# Patient Record
Sex: FEMALE | Race: WHITE | Employment: OTHER
[De-identification: names, ages, dates, MRNs, and addresses within clinical notes are randomized per-mention and may not be internally consistent; named-entity substitution may affect disease eponyms.]

---

## 2023-09-18 ENCOUNTER — APPOINTMENT (OUTPATIENT)
Facility: HOSPITAL | Age: 83
DRG: 312 | End: 2023-09-18
Payer: MEDICARE

## 2023-09-18 ENCOUNTER — HOSPITAL ENCOUNTER (INPATIENT)
Facility: HOSPITAL | Age: 83
LOS: 3 days | Discharge: NURSING, REHAB OR LONG-TERM CARE | DRG: 312 | End: 2023-09-21
Attending: STUDENT IN AN ORGANIZED HEALTH CARE EDUCATION/TRAINING PROGRAM | Admitting: HOSPITALIST
Payer: MEDICARE

## 2023-09-18 DIAGNOSIS — G62.9 NEUROPATHY: ICD-10-CM

## 2023-09-18 DIAGNOSIS — R55 NEAR SYNCOPE: ICD-10-CM

## 2023-09-18 DIAGNOSIS — R42 LIGHTHEADEDNESS: Primary | ICD-10-CM

## 2023-09-18 DIAGNOSIS — R00.0 TACHYCARDIA: ICD-10-CM

## 2023-09-18 PROBLEM — I48.0 PAF (PAROXYSMAL ATRIAL FIBRILLATION) (HCC): Status: ACTIVE | Noted: 2023-09-18

## 2023-09-18 PROBLEM — J18.9 PNEUMONIA: Status: ACTIVE | Noted: 2023-09-18

## 2023-09-18 PROBLEM — R19.7 DIARRHEA: Status: ACTIVE | Noted: 2023-09-18

## 2023-09-18 PROBLEM — I50.22 CHRONIC SYSTOLIC CONGESTIVE HEART FAILURE (HCC): Status: ACTIVE | Noted: 2023-09-18

## 2023-09-18 PROBLEM — Z87.09: Status: ACTIVE | Noted: 2023-09-18

## 2023-09-18 LAB
ALBUMIN SERPL-MCNC: 3.6 G/DL (ref 3.4–5)
ALBUMIN/GLOB SERPL: 1.1 (ref 0.8–1.7)
ALP SERPL-CCNC: 95 U/L (ref 45–117)
ALT SERPL-CCNC: 22 U/L (ref 13–56)
AMPHET UR QL SCN: NEGATIVE
ANION GAP SERPL CALC-SCNC: 8 MMOL/L (ref 3–18)
APPEARANCE UR: CLEAR
AST SERPL-CCNC: 18 U/L (ref 10–38)
BACTERIA URNS QL MICRO: ABNORMAL /HPF
BARBITURATES UR QL SCN: NEGATIVE
BASOPHILS # BLD: 0.1 K/UL (ref 0–0.1)
BASOPHILS NFR BLD: 1 % (ref 0–2)
BENZODIAZ UR QL: NEGATIVE
BILIRUB SERPL-MCNC: 0.5 MG/DL (ref 0.2–1)
BILIRUB UR QL: NEGATIVE
BUN SERPL-MCNC: 8 MG/DL (ref 7–18)
BUN/CREAT SERPL: 12 (ref 12–20)
CALCIUM SERPL-MCNC: 9.2 MG/DL (ref 8.5–10.1)
CANNABINOIDS UR QL SCN: NEGATIVE
CHLORIDE SERPL-SCNC: 108 MMOL/L (ref 100–111)
CO2 SERPL-SCNC: 26 MMOL/L (ref 21–32)
COCAINE UR QL SCN: NEGATIVE
COLOR UR: YELLOW
CREAT SERPL-MCNC: 0.68 MG/DL (ref 0.6–1.3)
DIFFERENTIAL METHOD BLD: ABNORMAL
EKG ATRIAL RATE: 62 BPM
EKG DIAGNOSIS: NORMAL
EKG P AXIS: 78 DEGREES
EKG P-R INTERVAL: 138 MS
EKG Q-T INTERVAL: 400 MS
EKG QRS DURATION: 78 MS
EKG QTC CALCULATION (BAZETT): 406 MS
EKG R AXIS: -5 DEGREES
EKG T AXIS: 36 DEGREES
EKG VENTRICULAR RATE: 62 BPM
EOSINOPHIL # BLD: 0.1 K/UL (ref 0–0.4)
EOSINOPHIL NFR BLD: 1 % (ref 0–5)
EPITH CASTS URNS QL MICRO: ABNORMAL /LPF (ref 0–5)
ERYTHROCYTE [DISTWIDTH] IN BLOOD BY AUTOMATED COUNT: 14.8 % (ref 11.6–14.5)
FLUAV RNA SPEC QL NAA+PROBE: NOT DETECTED
FLUBV RNA SPEC QL NAA+PROBE: NOT DETECTED
GLOBULIN SER CALC-MCNC: 3.4 G/DL (ref 2–4)
GLUCOSE SERPL-MCNC: 100 MG/DL (ref 74–99)
GLUCOSE UR STRIP.AUTO-MCNC: NEGATIVE MG/DL
HCT VFR BLD AUTO: 42 % (ref 35–45)
HGB BLD-MCNC: 13.6 G/DL (ref 12–16)
HGB UR QL STRIP: NEGATIVE
IMM GRANULOCYTES # BLD AUTO: 0 K/UL (ref 0–0.04)
IMM GRANULOCYTES NFR BLD AUTO: 1 % (ref 0–0.5)
KETONES UR QL STRIP.AUTO: 15 MG/DL
LEUKOCYTE ESTERASE UR QL STRIP.AUTO: ABNORMAL
LYMPHOCYTES # BLD: 2.2 K/UL (ref 0.9–3.6)
LYMPHOCYTES NFR BLD: 27 % (ref 21–52)
Lab: NORMAL
MAGNESIUM SERPL-MCNC: 1.8 MG/DL (ref 1.6–2.6)
MCH RBC QN AUTO: 30.9 PG (ref 24–34)
MCHC RBC AUTO-ENTMCNC: 32.4 G/DL (ref 31–37)
MCV RBC AUTO: 95.5 FL (ref 78–100)
METHADONE UR QL: NEGATIVE
MONOCYTES # BLD: 0.6 K/UL (ref 0.05–1.2)
MONOCYTES NFR BLD: 7 % (ref 3–10)
NEUTS SEG # BLD: 5 K/UL (ref 1.8–8)
NEUTS SEG NFR BLD: 64 % (ref 40–73)
NITRITE UR QL STRIP.AUTO: NEGATIVE
NRBC # BLD: 0 K/UL (ref 0–0.01)
NRBC BLD-RTO: 0 PER 100 WBC
NT PRO BNP: 1212 PG/ML (ref 0–1800)
OPIATES UR QL: NEGATIVE
PCP UR QL: NEGATIVE
PH UR STRIP: 7 (ref 5–8)
PLATELET # BLD AUTO: 402 K/UL (ref 135–420)
PMV BLD AUTO: 9.4 FL (ref 9.2–11.8)
POTASSIUM SERPL-SCNC: 3.7 MMOL/L (ref 3.5–5.5)
PROT SERPL-MCNC: 7 G/DL (ref 6.4–8.2)
PROT UR STRIP-MCNC: NEGATIVE MG/DL
RBC # BLD AUTO: 4.4 M/UL (ref 4.2–5.3)
RBC #/AREA URNS HPF: ABNORMAL /HPF (ref 0–5)
SARS-COV-2 RNA RESP QL NAA+PROBE: NOT DETECTED
SODIUM SERPL-SCNC: 142 MMOL/L (ref 136–145)
SP GR UR REFRACTOMETRY: 1.02 (ref 1–1.03)
TROPONIN I SERPL HS-MCNC: 11 NG/L (ref 0–54)
TROPONIN I SERPL HS-MCNC: 9 NG/L (ref 0–54)
TSH SERPL DL<=0.05 MIU/L-ACNC: 2.28 UIU/ML (ref 0.36–3.74)
UROBILINOGEN UR QL STRIP.AUTO: 0.2 EU/DL (ref 0.2–1)
WBC # BLD AUTO: 7.9 K/UL (ref 4.6–13.2)
WBC URNS QL MICRO: ABNORMAL /HPF (ref 0–5)

## 2023-09-18 PROCEDURE — 71045 X-RAY EXAM CHEST 1 VIEW: CPT

## 2023-09-18 PROCEDURE — 81001 URINALYSIS AUTO W/SCOPE: CPT

## 2023-09-18 PROCEDURE — 71275 CT ANGIOGRAPHY CHEST: CPT

## 2023-09-18 PROCEDURE — 87636 SARSCOV2 & INF A&B AMP PRB: CPT

## 2023-09-18 PROCEDURE — 85025 COMPLETE CBC W/AUTO DIFF WBC: CPT

## 2023-09-18 PROCEDURE — 87086 URINE CULTURE/COLONY COUNT: CPT

## 2023-09-18 PROCEDURE — 2580000003 HC RX 258: Performed by: HOSPITALIST

## 2023-09-18 PROCEDURE — 70450 CT HEAD/BRAIN W/O DYE: CPT

## 2023-09-18 PROCEDURE — 1100000003 HC PRIVATE W/ TELEMETRY

## 2023-09-18 PROCEDURE — 99285 EMERGENCY DEPT VISIT HI MDM: CPT

## 2023-09-18 PROCEDURE — 83735 ASSAY OF MAGNESIUM: CPT

## 2023-09-18 PROCEDURE — 2580000003 HC RX 258: Performed by: STUDENT IN AN ORGANIZED HEALTH CARE EDUCATION/TRAINING PROGRAM

## 2023-09-18 PROCEDURE — 74176 CT ABD & PELVIS W/O CONTRAST: CPT

## 2023-09-18 PROCEDURE — 83880 ASSAY OF NATRIURETIC PEPTIDE: CPT

## 2023-09-18 PROCEDURE — 6360000002 HC RX W HCPCS: Performed by: STUDENT IN AN ORGANIZED HEALTH CARE EDUCATION/TRAINING PROGRAM

## 2023-09-18 PROCEDURE — 96365 THER/PROPH/DIAG IV INF INIT: CPT

## 2023-09-18 PROCEDURE — 93005 ELECTROCARDIOGRAM TRACING: CPT | Performed by: STUDENT IN AN ORGANIZED HEALTH CARE EDUCATION/TRAINING PROGRAM

## 2023-09-18 PROCEDURE — 84484 ASSAY OF TROPONIN QUANT: CPT

## 2023-09-18 PROCEDURE — 6360000002 HC RX W HCPCS: Performed by: HOSPITALIST

## 2023-09-18 PROCEDURE — 6360000004 HC RX CONTRAST MEDICATION: Performed by: FAMILY MEDICINE

## 2023-09-18 PROCEDURE — 80053 COMPREHEN METABOLIC PANEL: CPT

## 2023-09-18 PROCEDURE — 80307 DRUG TEST PRSMV CHEM ANLYZR: CPT

## 2023-09-18 PROCEDURE — 84443 ASSAY THYROID STIM HORMONE: CPT

## 2023-09-18 RX ORDER — ACETAMINOPHEN 325 MG/1
650 TABLET ORAL EVERY 6 HOURS PRN
Status: DISCONTINUED | OUTPATIENT
Start: 2023-09-18 | End: 2023-09-21 | Stop reason: HOSPADM

## 2023-09-18 RX ORDER — HYDRALAZINE HYDROCHLORIDE 20 MG/ML
10 INJECTION INTRAMUSCULAR; INTRAVENOUS EVERY 6 HOURS PRN
Status: DISCONTINUED | OUTPATIENT
Start: 2023-09-18 | End: 2023-09-21 | Stop reason: HOSPADM

## 2023-09-18 RX ORDER — GABAPENTIN 300 MG/1
300 CAPSULE ORAL 3 TIMES DAILY
Status: DISCONTINUED | OUTPATIENT
Start: 2023-09-18 | End: 2023-09-18

## 2023-09-18 RX ORDER — GABAPENTIN 300 MG/1
1 CAPSULE ORAL 3 TIMES DAILY
Status: ON HOLD | COMMUNITY
Start: 2023-04-01 | End: 2023-09-21 | Stop reason: SDUPTHER

## 2023-09-18 RX ORDER — ENOXAPARIN SODIUM 100 MG/ML
40 INJECTION SUBCUTANEOUS DAILY
Status: DISCONTINUED | OUTPATIENT
Start: 2023-09-18 | End: 2023-09-21 | Stop reason: HOSPADM

## 2023-09-18 RX ORDER — 0.9 % SODIUM CHLORIDE 0.9 %
500 INTRAVENOUS SOLUTION INTRAVENOUS ONCE
Status: COMPLETED | OUTPATIENT
Start: 2023-09-18 | End: 2023-09-18

## 2023-09-18 RX ORDER — DEXTROSE AND SODIUM CHLORIDE 5; .45 G/100ML; G/100ML
INJECTION, SOLUTION INTRAVENOUS CONTINUOUS
Status: DISCONTINUED | OUTPATIENT
Start: 2023-09-18 | End: 2023-09-21 | Stop reason: HOSPADM

## 2023-09-18 RX ORDER — POLYETHYLENE GLYCOL 3350 17 G/17G
17 POWDER, FOR SOLUTION ORAL DAILY PRN
Status: DISCONTINUED | OUTPATIENT
Start: 2023-09-18 | End: 2023-09-21 | Stop reason: HOSPADM

## 2023-09-18 RX ORDER — 0.9 % SODIUM CHLORIDE 0.9 %
1000 INTRAVENOUS SOLUTION INTRAVENOUS ONCE
Status: DISCONTINUED | OUTPATIENT
Start: 2023-09-18 | End: 2023-09-18

## 2023-09-18 RX ORDER — POTASSIUM CHLORIDE 20 MEQ/1
20 TABLET, EXTENDED RELEASE ORAL ONCE
Status: DISCONTINUED | OUTPATIENT
Start: 2023-09-18 | End: 2023-09-21 | Stop reason: HOSPADM

## 2023-09-18 RX ORDER — ONDANSETRON 2 MG/ML
4 INJECTION INTRAMUSCULAR; INTRAVENOUS EVERY 6 HOURS PRN
Status: DISCONTINUED | OUTPATIENT
Start: 2023-09-18 | End: 2023-09-21 | Stop reason: HOSPADM

## 2023-09-18 RX ORDER — ACETAMINOPHEN 650 MG/1
650 SUPPOSITORY RECTAL EVERY 6 HOURS PRN
Status: DISCONTINUED | OUTPATIENT
Start: 2023-09-18 | End: 2023-09-21 | Stop reason: HOSPADM

## 2023-09-18 RX ORDER — AMOXICILLIN AND CLAVULANATE POTASSIUM 875; 125 MG/1; MG/1
1 TABLET, FILM COATED ORAL 2 TIMES DAILY
Status: ON HOLD | COMMUNITY
End: 2023-09-21 | Stop reason: HOSPADM

## 2023-09-18 RX ORDER — SODIUM CHLORIDE 0.9 % (FLUSH) 0.9 %
5-40 SYRINGE (ML) INJECTION PRN
Status: DISCONTINUED | OUTPATIENT
Start: 2023-09-18 | End: 2023-09-21 | Stop reason: HOSPADM

## 2023-09-18 RX ORDER — ONDANSETRON 4 MG/1
4 TABLET, ORALLY DISINTEGRATING ORAL EVERY 8 HOURS PRN
Status: DISCONTINUED | OUTPATIENT
Start: 2023-09-18 | End: 2023-09-21 | Stop reason: HOSPADM

## 2023-09-18 RX ORDER — GABAPENTIN 300 MG/1
300 CAPSULE ORAL 3 TIMES DAILY
Status: ON HOLD | COMMUNITY
End: 2023-09-18

## 2023-09-18 RX ORDER — SODIUM CHLORIDE 9 MG/ML
INJECTION, SOLUTION INTRAVENOUS PRN
Status: DISCONTINUED | OUTPATIENT
Start: 2023-09-18 | End: 2023-09-21 | Stop reason: HOSPADM

## 2023-09-18 RX ORDER — IPRATROPIUM BROMIDE AND ALBUTEROL SULFATE 2.5; .5 MG/3ML; MG/3ML
1 SOLUTION RESPIRATORY (INHALATION) EVERY 4 HOURS PRN
Status: DISCONTINUED | OUTPATIENT
Start: 2023-09-18 | End: 2023-09-21 | Stop reason: HOSPADM

## 2023-09-18 RX ORDER — MAGNESIUM SULFATE 1 G/100ML
1000 INJECTION INTRAVENOUS
Status: COMPLETED | OUTPATIENT
Start: 2023-09-18 | End: 2023-09-18

## 2023-09-18 RX ORDER — GABAPENTIN 300 MG/1
300 CAPSULE ORAL EVERY 8 HOURS PRN
Status: DISCONTINUED | OUTPATIENT
Start: 2023-09-18 | End: 2023-09-21 | Stop reason: HOSPADM

## 2023-09-18 RX ORDER — SODIUM CHLORIDE 0.9 % (FLUSH) 0.9 %
5-40 SYRINGE (ML) INJECTION EVERY 12 HOURS SCHEDULED
Status: DISCONTINUED | OUTPATIENT
Start: 2023-09-18 | End: 2023-09-21 | Stop reason: HOSPADM

## 2023-09-18 RX ADMIN — SODIUM CHLORIDE 500 ML: 9 INJECTION, SOLUTION INTRAVENOUS at 18:15

## 2023-09-18 RX ADMIN — IOPAMIDOL 100 ML: 755 INJECTION, SOLUTION INTRAVENOUS at 14:32

## 2023-09-18 RX ADMIN — SODIUM CHLORIDE, PRESERVATIVE FREE 10 ML: 5 INJECTION INTRAVENOUS at 22:24

## 2023-09-18 RX ADMIN — PIPERACILLIN AND TAZOBACTAM 3375 MG: 3; .375 INJECTION, POWDER, LYOPHILIZED, FOR SOLUTION INTRAVENOUS at 22:21

## 2023-09-18 RX ADMIN — ENOXAPARIN SODIUM 40 MG: 100 INJECTION SUBCUTANEOUS at 22:27

## 2023-09-18 RX ADMIN — DEXTROSE AND SODIUM CHLORIDE: 5; 450 INJECTION, SOLUTION INTRAVENOUS at 23:20

## 2023-09-18 RX ADMIN — MAGNESIUM SULFATE HEPTAHYDRATE 1000 MG: 1 INJECTION, SOLUTION INTRAVENOUS at 15:14

## 2023-09-18 RX ADMIN — SODIUM CHLORIDE 500 ML: 9 INJECTION, SOLUTION INTRAVENOUS at 12:56

## 2023-09-18 ASSESSMENT — PAIN - FUNCTIONAL ASSESSMENT: PAIN_FUNCTIONAL_ASSESSMENT: NONE - DENIES PAIN

## 2023-09-18 ASSESSMENT — LIFESTYLE VARIABLES: HOW OFTEN DO YOU HAVE A DRINK CONTAINING ALCOHOL: MONTHLY OR LESS

## 2023-09-18 NOTE — ED NOTES
Pt resting on stretcher, blood colected and sent to lab. Attempted to preform orthostatic BP readings however pt was unable to tolerate standing due to feeling of dizziness.       Samir Crane RN  09/18/23 8515

## 2023-09-18 NOTE — ED NOTES
Pt placed on purwick for comfort. Pt with no further needs at this time. Call kaylah nguyen.       Shwetha Coleman RN  09/18/23 2208

## 2023-09-18 NOTE — H&P
History & Physical    Patient: Yovanny Odell MRN: [id number]  CSN: [id number]    YOB: 1940  Age: 82 y.o. Sex: female      DOA: 9/18/2023  Primary Care Provider:  Ania Puente DO      Assessment/Plan     Active Hospital Problems    Diagnosis Date Noted    Near syncope [R55] 09/18/2023    Diarrhea [R19.7] 09/18/2023    Pneumonia [J18.9] 09/18/2023    PAF (paroxysmal atrial fibrillation) (HCC) [I48.0] 09/18/2023    Chronic systolic congestive heart failure (HCC) [I50.22] 09/18/2023    Hx of pleural empyema [Z87.09] 09/18/2023         Admit to tele     Near syncope   + for orthostatic hypotension -received 1 L fluid-will continue iv hydration like gi loss -recheck orthostatic tomorrow   Neuro check   Cta chest no PE   Will have cardiac monitoring   Ct head  and fall precaution   Echo and trop monitoring   Cardiologist consulted       Pna hx of empyema -chest tube drain and emphysema reported also   Planning augmentin for 28 days,hold per her self-will start vanc and zosyn ID consult   Flu and covid 19 negative   Breathing tx as needed     Diarrhea   Iv hydration, balance the electrolytes   Need to r/o c diff     Paf cardiac monitoring   Received mg in ER and will give K also to keep mg at 2 and K at 4     Chf    Ef 40-45 % from sentera reported   Low rate iv hydration watch for fluid overloaded      Full code   Please note that this dictation was completed with [employer], the [employer] voice recognition software. Quite often unanticipated grammatical, syntax, homophones, and other interpretive errors are inadvertently transcribed by the computer software. Please disregard these errors.   Please excuse any errors that have escaped final proofreading    Estimate  length of stay : 2-3 day    DVT : heparin ppi proph  CC: near syncope        HPI:     Yovanny Odell is a 82 y.o. female with hx of empyema on augmentin over 28 days-request chest tube, paf, chf, sepsis due to pna presented to ER due to dizziness \"LDLCALC\", \"LABVLDL\", \"CHOLHDLRATIO\"  Cardiac Enzymes: No results found for: \"CKTOTAL\", \"CKMB\", \"CKMBINDEX\", \"TROPONINI\"  Urin analysis:   Recent Labs     09/18/23  1518   [facility] 1.025   PROTEINU Negative   GLUCOSEU Negative   KETUA 15*   BILIRUBINUR Negative   NITRU Negative   LEUKOCYTESUR SMALL*       Imaging results last 24 hrs :CTA CHEST W WO CONTRAST    Result Date: 9/18/2023  EXAM:  CTA CHEST W WO CONTRAST INDICATION: Pulmonary embolism, persistent tachycardia, history of pneumonia, empyema COMPARISON: None. TECHNIQUE: Helical thin section chest CT following uneventful intravenous administration of nonionic contrast 100 mL of isovue 370 according to departmental PE protocol. Coronal and sagittal reformats were performed. 3D post processing was performed. CT dose reduction was achieved through the use of a standardized protocol tailored for this examination and automatic exposure control for dose modulation. FINDINGS: This is a good quality study for the evaluation of pulmonary embolism to the first subsegmental arterial level. There is no pulmonary embolism to this level. THYROID: No nodule. MEDIASTINUM: No mass or lymphadenopathy. ERICA: No mass or lymphadenopathy. THORACIC AORTA: No aneurysm. HEART: Normal in size. ESOPHAGUS: No wall thickening or dilatation. TRACHEA/BRONCHI: Patent. PLEURA: Trace right pleural effusion, with small loculation along right major fissure. No pneumothorax. LUNGS: Moderate emphysema. Small pleural-based consolidation noted in the right upper lobe anteriorly (series 3 image 89). Mild bibasilar atelectasis. UPPER ABDOMEN: Partially imaged. No acute pathology. BONES: No aggressive bone lesion or fracture. 1.  Negative for PE. 2.  Small right upper lobe consolidation may represent pneumonia. 3.  Trace right pleural effusion. XR CHEST PORTABLE    Result Date: 9/18/2023  EXAM: Portable CXR. COMPARISON: None.   INDICATION: Syncope FINDINGS: There is small right

## 2023-09-18 NOTE — ED PROVIDER NOTES
ECGs available         Radiologic Studies:   Non-plain film images such as CT, Ultrasound and MRI are read by the radiologist. See ED course for any wet reads documented by Dr. Danica Ward     Interpretation per the Radiologist below, if available at the time of this note:    XR CHEST PORTABLE    (Results Pending)       EKG interpretation  See ED course for EKG interpreted by Dr. Danica Ward    Procedures     Unless otherwise noted below, none. Procedures    ED Course     10:41 AM EDT I (Dr. Danica Ward) am the first provider for this patient. Initial assessment performed. I reviewed the vital signs, available nursing notes, past medical history, past surgical history, family history and social history. The patients presenting problems have been discussed, and they are in agreement with the care plan formulated and outlined with them. I have encouraged them to ask questions as they arise throughout their visit. Records Reviewed: Nursing Notes    Is this patient to be included in the SEP-1 core measure? No Exclusion criteria - the patient is NOT to be included for SEP-1 Core Measure due to: 2+ SIRS criteria are not met    MEDICATIONS ADMINISTERED IN THE ED:  Medications - No data to display    ED Course as of 09/18/23 1211   Mon Sep 18, 2023   1039 On my interpretation of EKG [id number] ;NSR rate 62 qtc 406, no BON/STD. [RK]   1210 CBC and CMP reviewed and acutely normal.  [RK]      ED Course User Index  [RK] Cruzito Amaya,        None    Screenings                  CIWA Assessment  BP: (!) 146/104  Pulse: (!) 118       Medical Decision Making     DISCUSSION:  82year old female w/ history of recent empyema and several days of worsening persistent lightheadedness, near-syncope. I do NOT feel this is neurologic in nature as there is no vertigo, vision changes, ataxia, dysmetria, strength or sensory changes, confusion. Suspect arrythmia, hypololemia, orthostatic hypotension, vasovagal syncope, as primary cause. Recently:  Patient was restarted on metoprolol 25mg from ED due to tachycardia at ED. She had cbc, cmp, trop, checked and ekg. Trop was 13. Plan is to obtain CTA chest to rule out pulmonary embolus. She has risk factor of recent prolonged hospitalization but no history of blood clots. Persistent runs of tachycardia yesterday and today are concerning for PE could also be arrhythmia. Patient does not know when last echo was likely not in the last several months per our conversation. Will also check CBC, bnp, chemistry, orthostatic vital signs, magnesium level, TSH, urinalysis, urine culture. Doubt infectious source (was completing multiple weeks of antibiotics with no recent fevers, no cough, no infectious sxs), do not suspect sepsis (no fever, no source, no elevated respiratory rate, no confusion). Will give 500cc fluid bolus for start pending lab workup as Im unsure how bad her CHF is at this time. Re-eval  Patient continues to have persistent on and off tachycardia but not afib in appearance. Consulted cardiology. CTA negative for PE. CT shows perhaps R UL pneumonia but doubtful given length of antibiotics and knowing her hx, doubt she would have a normal xray so soon after her other condition. Orthostatic VS positive. Plan to admit to medicine. Critical Care Time:     I have spent 0 minutes of critical care time involved in lab review, consultations with specialist, family decision-making, and documentation. This time does not include time spent on separately billable procedures. During this entire length of time, I was immediately available to the patient. Critical Care: The reason for providing this level of medical care for this critically ill patient was due a critical illness that impaired one or more vital organ systems such that there was a high probability of imminent or life-threatening deterioration in the patient's condition.  This care involved high complexity decision making to

## 2023-09-18 NOTE — FLOWSHEET NOTE
09/18/23 1248   Vital Signs   Blood Pressure Lying 157/74   Pulse Lying 61 PER MINUTE   Blood Pressure Sitting 133/73   Pulse Sitting 73 PER MINUTE       Pt stated feeling too dizzy to stand up.

## 2023-09-18 NOTE — ED NOTES
TRANSFER - OUT REPORT:    Verbal report given to [address] [place] [address] on Wyonia Councilman  being transferred to  for routine progression of patient care       Report consisted of patient's Situation, Background, Assessment and   Recommendations(SBAR). Information from the following report(s) ED SBAR was reviewed with the receiving nurse. [place] [facility] Assessment:    Presents to emergency department  because of falls (Syncope, seizure, or loss of consciousness): Yes  Age > 79: Yes  Altered Mental Status, Intoxication with alcohol or substance confusion (Disorientation, impaired judgment, poor safety awaremess, or inability to follow instructions): No  Impaired Mobility: Ambulates or transfers with assistive devices or assistance; Unable to ambulate or transer.: Yes             Lines:   Peripheral IV 09/18/23 Right Antecubital (Active)   Site Assessment Clean, dry & intact 09/18/23 1052   Line Status Blood return noted 09/18/23 1052   Phlebitis Assessment No symptoms 09/18/23 1052        Opportunity for questions and clarification was provided.       Patient transported with:  Monitor and Registered Nurse          Edinson Lynch RN  09/18/23 [phone]

## 2023-09-18 NOTE — ED NOTES
Attempted to pull blood off IV line unsuccessful. Call placed to RRT to come an place line/ collect blood work, no answer. Pt resting on stretcher awake, alert and oriented X 4.       Manuela Petty RN  09/18/23 6249

## 2023-09-18 NOTE — ED NOTES
Spoke with Dr. Tish Blunt reported that patient wishes to a DNR      Rachel Blackman RN  09/18/23 7028

## 2023-09-18 NOTE — ED NOTES
Pt resting on stretcher, no needs at this time. Call bell in reach. MD at bedside.       Caro Soliz RN  09/18/23 2462

## 2023-09-19 ENCOUNTER — APPOINTMENT (OUTPATIENT)
Facility: HOSPITAL | Age: 83
DRG: 312 | End: 2023-09-19
Attending: HOSPITALIST
Payer: MEDICARE

## 2023-09-19 ENCOUNTER — APPOINTMENT (OUTPATIENT)
Facility: HOSPITAL | Age: 83
DRG: 312 | End: 2023-09-19
Payer: MEDICARE

## 2023-09-19 PROBLEM — E87.6 HYPOKALEMIA: Status: ACTIVE | Noted: 2023-09-19

## 2023-09-19 LAB
ANION GAP SERPL CALC-SCNC: 6 MMOL/L (ref 3–18)
BACTERIA SPEC CULT: NORMAL
BASOPHILS # BLD: 0.1 K/UL (ref 0–0.1)
BASOPHILS NFR BLD: 1 % (ref 0–2)
BUN SERPL-MCNC: 11 MG/DL (ref 7–18)
BUN/CREAT SERPL: 19 (ref 12–20)
CALCIUM SERPL-MCNC: 8.3 MG/DL (ref 8.5–10.1)
CC UR VC: NORMAL
CHLORIDE SERPL-SCNC: 112 MMOL/L (ref 100–111)
CO2 SERPL-SCNC: 25 MMOL/L (ref 21–32)
CREAT SERPL-MCNC: 0.58 MG/DL (ref 0.6–1.3)
DIFFERENTIAL METHOD BLD: ABNORMAL
EOSINOPHIL # BLD: 0.2 K/UL (ref 0–0.4)
EOSINOPHIL NFR BLD: 2 % (ref 0–5)
ERYTHROCYTE [DISTWIDTH] IN BLOOD BY AUTOMATED COUNT: 15 % (ref 11.6–14.5)
GLUCOSE SERPL-MCNC: 113 MG/DL (ref 74–99)
HCT VFR BLD AUTO: 34.5 % (ref 35–45)
HGB BLD-MCNC: 11.2 G/DL (ref 12–16)
IMM GRANULOCYTES # BLD AUTO: 0.1 K/UL (ref 0–0.04)
IMM GRANULOCYTES NFR BLD AUTO: 1 % (ref 0–0.5)
LYMPHOCYTES # BLD: 2.2 K/UL (ref 0.9–3.6)
LYMPHOCYTES NFR BLD: 23 % (ref 21–52)
MAGNESIUM SERPL-MCNC: 1.9 MG/DL (ref 1.6–2.6)
MCH RBC QN AUTO: 31.2 PG (ref 24–34)
MCHC RBC AUTO-ENTMCNC: 32.5 G/DL (ref 31–37)
MCV RBC AUTO: 96.1 FL (ref 78–100)
MONOCYTES # BLD: 1 K/UL (ref 0.05–1.2)
MONOCYTES NFR BLD: 10 % (ref 3–10)
NEUTS SEG # BLD: 5.9 K/UL (ref 1.8–8)
NEUTS SEG NFR BLD: 63 % (ref 40–73)
NRBC # BLD: 0.06 K/UL (ref 0–0.01)
NRBC BLD-RTO: 0.6 PER 100 WBC
PLATELET # BLD AUTO: 373 K/UL (ref 135–420)
PMV BLD AUTO: 9.6 FL (ref 9.2–11.8)
POTASSIUM SERPL-SCNC: 3.4 MMOL/L (ref 3.5–5.5)
RBC # BLD AUTO: 3.59 M/UL (ref 4.2–5.3)
SERVICE CMNT-IMP: NORMAL
SODIUM SERPL-SCNC: 143 MMOL/L (ref 136–145)
TROPONIN I SERPL HS-MCNC: 10 NG/L (ref 0–54)
TROPONIN I SERPL HS-MCNC: 9 NG/L (ref 0–54)
WBC # BLD AUTO: 9.4 K/UL (ref 4.6–13.2)

## 2023-09-19 PROCEDURE — 99223 1ST HOSP IP/OBS HIGH 75: CPT | Performed by: NURSE PRACTITIONER

## 2023-09-19 PROCEDURE — 97165 OT EVAL LOW COMPLEX 30 MIN: CPT

## 2023-09-19 PROCEDURE — 99497 ADVNCD CARE PLAN 30 MIN: CPT | Performed by: NURSE PRACTITIONER

## 2023-09-19 PROCEDURE — 83735 ASSAY OF MAGNESIUM: CPT

## 2023-09-19 PROCEDURE — 97530 THERAPEUTIC ACTIVITIES: CPT

## 2023-09-19 PROCEDURE — 6360000002 HC RX W HCPCS: Performed by: HOSPITALIST

## 2023-09-19 PROCEDURE — 97162 PT EVAL MOD COMPLEX 30 MIN: CPT

## 2023-09-19 PROCEDURE — 2580000003 HC RX 258: Performed by: HOSPITALIST

## 2023-09-19 PROCEDURE — 93880 EXTRACRANIAL BILAT STUDY: CPT

## 2023-09-19 PROCEDURE — 1100000003 HC PRIVATE W/ TELEMETRY

## 2023-09-19 PROCEDURE — 80048 BASIC METABOLIC PNL TOTAL CA: CPT

## 2023-09-19 PROCEDURE — 6370000000 HC RX 637 (ALT 250 FOR IP): Performed by: HOSPITALIST

## 2023-09-19 PROCEDURE — 36415 COLL VENOUS BLD VENIPUNCTURE: CPT

## 2023-09-19 PROCEDURE — 2500000003 HC RX 250 WO HCPCS: Performed by: HOSPITALIST

## 2023-09-19 PROCEDURE — 85025 COMPLETE CBC W/AUTO DIFF WBC: CPT

## 2023-09-19 PROCEDURE — 70551 MRI BRAIN STEM W/O DYE: CPT

## 2023-09-19 PROCEDURE — 6370000000 HC RX 637 (ALT 250 FOR IP): Performed by: INTERNAL MEDICINE

## 2023-09-19 PROCEDURE — 84484 ASSAY OF TROPONIN QUANT: CPT

## 2023-09-19 RX ORDER — MAGNESIUM SULFATE HEPTAHYDRATE 40 MG/ML
2000 INJECTION, SOLUTION INTRAVENOUS ONCE
Status: COMPLETED | OUTPATIENT
Start: 2023-09-19 | End: 2023-09-19

## 2023-09-19 RX ORDER — CLINDAMYCIN HYDROCHLORIDE 150 MG/1
300 CAPSULE ORAL EVERY 6 HOURS SCHEDULED
Status: DISCONTINUED | OUTPATIENT
Start: 2023-09-19 | End: 2023-09-21 | Stop reason: HOSPADM

## 2023-09-19 RX ORDER — POTASSIUM CHLORIDE 20 MEQ/1
40 TABLET, EXTENDED RELEASE ORAL ONCE
Status: COMPLETED | OUTPATIENT
Start: 2023-09-19 | End: 2023-09-19

## 2023-09-19 RX ADMIN — ENOXAPARIN SODIUM 40 MG: 100 INJECTION SUBCUTANEOUS at 10:06

## 2023-09-19 RX ADMIN — SODIUM CHLORIDE, PRESERVATIVE FREE 10 ML: 5 INJECTION INTRAVENOUS at 09:00

## 2023-09-19 RX ADMIN — CLINDAMYCIN HYDROCHLORIDE 300 MG: 150 CAPSULE ORAL at 21:09

## 2023-09-19 RX ADMIN — SODIUM CHLORIDE, PRESERVATIVE FREE 10 ML: 5 INJECTION INTRAVENOUS at 21:11

## 2023-09-19 RX ADMIN — CLINDAMYCIN HYDROCHLORIDE 300 MG: 150 CAPSULE ORAL at 18:11

## 2023-09-19 RX ADMIN — MAGNESIUM SULFATE HEPTAHYDRATE 2000 MG: 40 INJECTION, SOLUTION INTRAVENOUS at 18:12

## 2023-09-19 RX ADMIN — POTASSIUM CHLORIDE 40 MEQ: 1500 TABLET, EXTENDED RELEASE ORAL at 10:05

## 2023-09-19 RX ADMIN — PIPERACILLIN AND TAZOBACTAM 3375 MG: 3; .375 INJECTION, POWDER, LYOPHILIZED, FOR SOLUTION INTRAVENOUS at 05:38

## 2023-09-19 NOTE — CONSULTS
NEUROLOGY CONSULTATION NOTE    Patient: Eugene Tyler MRN: [id number]  [facility]: [id number]    YOB: 1940  Age: 82 y.o. Sex: female    DOA: 2023 LOS:  LOS: 1 day        Requesting Physician: Dr. Geraldo Becerril  Reason for Consultation: Presyncope               HISTORY OF PRESENT ILLNESS:   Eugene Tyler is a 82 y.o. female with history of Pneumonia with sepsis, status post thoracic tube for pleural effusion, who presented to hospital with dizziness with positional changes. She feels dizzy on lying and on sitting. Sitting may increase the dizziness. She was seen to have low blood pressure when she got dizzy. She did not lose consciousness. She was on several antibiotics for 28 days. She stopped antibiotics three days ago due to diarrhea and G. I. problems. She denies lateralized weakness or numbness. PAST MEDICAL HISTORY:  Past Medical History:   Diagnosis Date    Acute renal failure (ARF) ([address])     Pleural effusion     Sepsis ([address])      PAST SURGICAL HISTORY:  Past Surgical History:   Procedure Laterality Date    CERVICAL FUSION      LUMBAR FUSION       FAMILY HISTORY:  No family history on file. SOCIAL HISTORY:  Social History     Socioeconomic History    Marital status:     Tobacco Use    Smoking status: Former     Packs/day: 0.50     Years: 25.00     Additional pack years: 0.00     Total pack years: 12.50     Types: Cigarettes     Quit date:      Years since quittin.    Smokeless tobacco: Never   Substance and Sexual Activity    Alcohol use: Not Currently    Drug use: Never     MEDICATIONS:  Current Facility-Administered Medications   Medication Dose Route Frequency    magnesium sulfate 2000 mg in water 50 mL IVPB  2,000 mg IntraVENous Once    sodium chloride flush 0.9 % injection 5-40 mL  5-40 mL IntraVENous 2 times per day    sodium chloride flush 0.9 % injection 5-40 mL  5-40 mL IntraVENous PRN    0.9 % sodium chloride infusion   IntraVENous PRN    enoxaparin (LOVENOX) injection 40 mg  40

## 2023-09-19 NOTE — PROGRESS NOTES
Hospitalist Progress Note-critical care note     Patient: Bonita Tarango MRN: [id number]  CSN: [id number]    YOB: 1940  Age: 82 y.o. Sex: female    DOA: 9/18/2023 LOS:  LOS: 1 day            Chief complaint: near syncope , diarrhea , pna, paf , chg hx of pleural empyema     Assessment/Plan         Active Hospital Problems    Diagnosis Date Noted    Hypokalemia [E87.6] 09/19/2023    Near syncope [R55] 09/18/2023    Diarrhea [R19.7] 09/18/2023    Pneumonia [J18.9] 09/18/2023    PAF (paroxysmal atrial fibrillation) ([facility]) [I48.0] 09/18/2023    Chronic systolic congestive heart failure ([address]) [I50.22] 09/18/2023    Hx of pleural empyema [Z87.09] 09/18/2023          Near syncope   + for orthostatic hypotension -received fluid and will continue monitoring and repeat   Cta chest no PE   Ct head no acute issue   and fall precaution   Echo and trop monitoring   Cardiologist consulted -recommend neuro consult-talked with neurologist   Will have mri and carotid ultrasound   Cardiac monitoring results still pending -2 weeks monitoring In her cardiologist office        Hypokalemia   K replacement      Pna hx of empyema -chest tube drain and emphysema reported also   Planning augmentin for 28 days,hold per her self-will start vanc and zosyn-talked with dr. Nickie Owens on admission   Flu and covid 19 negative   Breathing tx as needed      Diarrhea -only one time bm -too solid for c diff-will cancel lab if continue no diarrhea      Paf cardiac monitoring   Received mg in ER and will give K also to keep mg at 2 and K at 4      Chf    Ef 40-45 % from sentera reported       Subjective still having dizziness , dizziness before d/c from Yessi Strickland she was at the bedside. All questions have been answered. 55 total min's spent on patient care including >50% on counseling/coordinating care. Discussed the above assessments.  also discussed labs, medications and hospital course      Disposition :tbd,   Review of size. There is no pulmonary edema. There is no evident pneumothorax or pleural effusion. Small right pleural effusion. Otherwise unremarkable study.       Cole Garcias MD

## 2023-09-19 NOTE — PROGRESS NOTES
Case Management Assessment  Initial Evaluation    Date/Time of Evaluation: 9/19/2023 10:44 AM  Assessment Completed by: Jasmin Garber    If patient is discharged prior to next notation, then this note serves as note for discharge by case management. Patient Name: Aliyah Eason                   YOB: 1940  Diagnosis: Near syncope [R55]                   Date / Time: 9/18/2023 10:16 AM    Patient Admission Status: Inpatient   Readmission Risk (Low < 19, Mod (19-27), High > 27): Readmission Risk Score: 10.3    Current PCP: Rj Mckenzie, DO  PCP verified by CM? Chart Reviewed: Yes      History Provided by:    Patient Orientation:      Patient Cognition:      Hospitalization in the last 30 days (Readmission):  No    If yes, Readmission Assessment in CM Navigator will be completed. Advance Directives:      Code Status: DNR   Patient's Primary Decision Maker is: Named in Scanned ACP Document    Primary Decision Maker: [facility] - Charity    Secondary Decision Maker: Bal[facility] - [phone]    Discharge Planning:    Patient lives with: Alone Type of Home: House  Primary Care Giver:    Patient Support Systems include:     Current Financial resources:    Current community resources:    Current services prior to admission:              Current DME:              Type of Home Care services:  None    ADLS  Prior functional level:    Current functional level:      PT AM-PAC:   /24  OT AM-PAC:   /24    Family can provide assistance at DC: Would you like Case Management to discuss the discharge plan with any other family members/significant others, and if so, who?     Plans to Return to Present Housing:    Other Identified Issues/Barriers to RETURNING to current housing: n/a  Potential Assistance needed at discharge:              Potential DME:    Patient expects to discharge to: [address] [place] [address] for transportation at discharge:      Financial    Payor: Danyelle Mosquera / Plan: MEDICARE PART A AND B / Product Type: *No Product type* /     Does insurance require precert for SNF: No    Potential assistance Purchasing Medications: No  Meds-to-Beds request: Yes    No Pharmacies Listed    Notes:    Factors facilitating achievement of predicted outcomes: n/a    Barriers to discharge: n/a    Additional Case Management Notes: Patient admitted from home. Patient lives alone and was going to start with home instead home care but returned to acute setting and has been unable to start care at home. Patient stated that she has been in an out of [facility] as well as going to [facility] and [facility]. Patient stated that she is unsure Austin Maryan \" this keeps happening to her but would like further workup. Therapy to evaluate patient as well regarding functional status. SW to follow to assist with DC planning needs. The Plan for Transition of Care is related to the following treatment goals of Near syncope [V60]    IF APPLICABLE: The Patient and/or patient representative Herson Ma and her family were provided with a choice of provider and agrees with the discharge plan. Freedom of choice list with basic dialogue that supports the patient's individualized plan of care/goals and shares the quality data associated with the providers was provided to: Patient   Patient Representative Name:       The Patient and/or Patient Representative Agree with the Discharge Plan?  Yes    Kendall Ward  Case Management Department  Ph: [phone] Fax: n/a

## 2023-09-19 NOTE — ACP (ADVANCE CARE PLANNING)
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 conversation. Individuals present for the conversation: Patient, MPOA/Friend Lila Alcaraz      Summary of the conversation:  Patient made the decision today for DNR/DNI, limited additional interventions, and no feeding tubes. POST form signed. Patient has an AMD on file naming Heriberto Santana (friend) as primary MPOA and Lila Alcaraz (friend) as secondary MPOA. Patient confirms this is correct. Details of the conversation and documents completed: Patient is oriented x 4. MPOA/friend Lila Alcaraz at bedside. Introduced our role as palliative medicine team. Support offered as patient describes her near syncope, ongoing for ~1 week, and significantly worried about going home if symptoms persist. Patient lives alone, active, uses stationary bike for 30 minutes twice a day, cooks own meals, and does her own cleaning. Patient does not drive anymore because of her right leg neuropathy. Irving Mcdonald states she checks on patient often, especially since being home since her recent admission to [facility] for pneumonia. Patient has a good understanding of current health situation. Reviewed patient's AMD on file naming Heriberto Santana (friend) as primary MPOA and Lila Alcaraz (friend) as secondary MPOA. Patient confirms this is correct. Discussed the benefits and burdens of CPR in the event of cardiopulmonary arrest.  Discussed the benefits and burdens of intubation in the event of respiratory decline pre-arrest. Discussed the benefits and burdens of feeding tubes in the event of dysphagia or nutritional decline.  Patient states she would not want CPR, intubation, or feeding tubes for

## 2023-09-19 NOTE — PROGRESS NOTES
PT order received and chart reviewed. Attempted to see pt for PT session, RN in room then palliative entering. Will follow up shortly for PT evaluation.    Quoc Michaud, PT, DPT

## 2023-09-19 NOTE — PROGRESS NOTES
Nightshift nurse stated stool sample was sent down per orders for testing. Nurse stated that lab informed her stool was to formed to test for c.diff. Dr. Melisa Rios informed of labs response to stool sample that was sent last night and that patient has had no further bowel movements today. Per Dr. Melisa Rios, if patient does not have more diarrhea or loose stools by the end of day today, enteric isolation and c.diff testing may be cancelled.

## 2023-09-19 NOTE — PROGRESS NOTES
RN observed swelling in right upper extremity. On assessment, severe swelling noted from base of shoulder/underarm to lower forearm. Patient reports pain. Skin hard/taught around insertion site of extended dwell PIV and elbow. As you assess away from the elbow, the skin becomes softer and palpable but remains edematous. IV fluids immediately stopped. Extended dwell PIV removed. Areas of swelling marked for further assessment. Dr. Jamaica Matthew informed of IV infiltration with normal saline. No new orders at this time.

## 2023-09-19 NOTE — PROGRESS NOTES
Phlebotomist attempted to obtain blood per peripheral stick for labs. Patient refused, stating \" I don't want to be stuck again. \". This nurse attempted to encourage patient for the lab draw, but continued refusal stating, \"I'll do it later. \".  We will attempt redraw at a later time

## 2023-09-19 NOTE — PROGRESS NOTES
Bedside and Verbal shift change report given to Lor Lr and Meghna Thompson RN (oncoming nurse) by Stacy Boyd (offgoing nurse). Report included the following information Nurse Handoff Report, Adult Overview, Intake/Output, Recent Results, and Med Rec Status.

## 2023-09-19 NOTE — CONSULTS
Palliative Medicine Consult    Patient Name: Alok Patel  YOB: 1940    Date of Initial Consult: 9/19/2023  Reason for Consult: Goals of care discussions  Requesting Provider: Sera Garvey MD   Primary Care Physician: [facility], DO      SUMMARY:   Alok Patel is a 82 y.o. 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 Current medical issues leading to Palliative Medicine involvement include: goals of care discussions . PALLIATIVE DIAGNOSES:   Goals of care/advance care planning  Near syncope  Orthostatic hypotension  CHF       PLAN:   Goals of care/advance care planning:  Reviewed records from the following unique sources (outside institutions or providers from different services): hospitalist, cardiologist. Reviewed the results of each unique test including CT head , which shows No acute intracranial hemorrhage, mass or infarct. CTA chest negative for PE, CT abdomen pelvis with no acute findings, chest x-ray shows small right pleural effusion, COVID-19 and influenza test negative, normal TSH, negative UDS, mildly anemic with Hgb 11.2, UA not impressive for UTI (culture pending), and positive orthostatic BP readings in ED when going from supine to sitting. Assessment required an independent historian, additional information obtained from patient's MPOA who reports patient is usually independent, highly functioning, and does not have dizziness at baseline. Patient agreed to a goals of care discussion with her [address] (friend) at bedside. Patient has an AMD on file naming Alfred Preciado (friend) as primary MPOA and Harper Alcantar (friend) as secondary MPOA. Patient confirms this is correct.  Patient made the decision Lab Results   Component Value Date/Time     09/19/2023 05:02 AM    K 3.4 09/19/2023 05:02 AM     09/19/2023 05:02 AM    CO2 25 09/19/2023 05:02 AM    BUN 11 09/19/2023 05:02 AM    MG 1.9 09/19/2023 05:02 AM      Lab Results   Component Value Date/Time    GLOB 3.4 09/18/2023 10:24 AM     No results found for: \"INR\", \"PTMR\", \"PT1\", \"APTT\"   No results found for: \"IRON\", \"TIBC\", \"IBCT\", \"FERR\"   No results found for: \"PH\", \"PCO2\", \"PO2\"  No components found for: \"GLPOC\"   No results found for: \"CPK\", \"CKMB\", \"TROPONINI\"               Today, I am providing high complexity decision making for patient with the diagnosis of near syncope with orthostatic hypotension and ongoing investigation, with chronic congestive heart failure, PAF, and recent admission at [facility] on 8/25/2023 for pneumonia which required a chest tube insertion for empyema, all which poses a threat to life or bodily function. Disclaimer: Sections of this note are dictated using utilizing voice recognition software, which may have resulted in some phonetic based errors in grammar and contents. Even though attempts were made to correct all the mistakes, some may have been missed, and remained in the body of the document. If questions arise, please contact our department.

## 2023-09-19 NOTE — PLAN OF CARE
Problem: Discharge Planning  Goal: Discharge to home or other facility with appropriate resources  Outcome: Progressing  Flowsheets (Taken 9/18/2023 4959)  Discharge to home or other facility with appropriate resources:   Identify barriers to discharge with patient and caregiver   Arrange for needed discharge resources and transportation as appropriate   Identify discharge learning needs (meds, wound care, etc)     Problem: Safety - Adult  Goal: Free from fall injury  Outcome: Progressing     Problem: ABCDS Injury Assessment  Goal: Absence of physical injury  Outcome: Progressing

## 2023-09-19 NOTE — CONSULTS
[facility] Consult Note      Patient: Kellie Higginbotham MRN: [id number]  SSN: [id number]    YOB: 1940  Age: 82 y.o. Sex: female    Date of Consultation: 9/18/2023    Reason for Consultation: Near syncope    HPI:  I was asked by Dr. Ainsley Palacios to see this pleasant patient for near syncope. Liz Murguia is a  82year-old pleasant patient came to the hospital with symptoms of on and off near-syncope without any relationship to physical activity. In the ER patient was found to have mildly sinus tachycardiac. CT scan is negative for pulmonary embolism without any evidence of anemia, thyroid disorder, fever, sepsis. Patient have recent empyema treated with long-term antibiotic. Patient have just completed 2 weeks of Holter monitor, complete report to follow. Patient is feeling that she is going to passed out in the ER while I was seeing the patient at that time patient have 100% oxygen saturation, heart rate 70 beats per minute blood pressure was 140/70 mmHg. Patient have history of neuropathy. Patient denied any history of vertigo or ER problem or neck arthritis.                 Past Medical History:   Diagnosis Date    Acute renal failure (ARF) (HCC)     Pleural effusion     Sepsis ([address])      Past Surgical History:   Procedure Laterality Date    CERVICAL FUSION      LUMBAR FUSION       Current Facility-Administered Medications   Medication Dose Route Frequency    sodium chloride flush 0.9 % injection 5-40 mL  5-40 mL IntraVENous 2 times per day    sodium chloride flush 0.9 % injection 5-40 mL  5-40 mL IntraVENous PRN    0.9 % sodium chloride infusion   IntraVENous PRN    enoxaparin (LOVENOX) injection 40 mg  40 mg SubCUTAneous Daily    ondansetron (ZOFRAN-ODT) disintegrating tablet 4 mg  4 mg Oral Q8H PRN    Or    ondansetron (ZOFRAN) injection 4 mg  4 mg IntraVENous Q6H PRN    polyethylene glycol (GLYCOLAX) packet 17 g  17 g Oral Daily PRN    acetaminophen (TYLENOL) tablet 650 mg  650 mg Oral Q6H PRN    Or

## 2023-09-19 NOTE — CONSULTS
[facility]  (A Division of [facility])                                                           Date of Admission: 9/18/2023   Date of Note: 9/19/2023  Reason for Consult: Pneumonia/History of parapneumonic effusion--   Referring MD: Dr Masood Hartman    Thank you for involving me in the care of this patient. Please do not hesitate to contact me on the above number if question or concern. Current Antimicrobials:    Prior Antimicrobials:  Zosyn 9/18 to date     Antibiotics 8/16-IV Unasyn to 8.31  PO augmentin 9/1 to 9/16   Allergy to antibiotics: None       Assessment--ID related:     Right lung parapneumonic effusion/ Empyema- S/P chest tube and treatment at [facility] 8/16 to 9/1/23  -- CT chest removed 8/31. NO VATS, was followed by CTS at [facility]  --Pleural fluid culture X2-- aerobic no growth- last 8/28. Anaerobic sent only on 8/28- NG, cytology negative. AFB. Fungal cutlure NGTD-- ELIS NA  --viral test- covid/flu A/B- negative  --CTA chest-- no PE. Right apical nodular lesion and fissral opacity looks same to my review of CT scans on 9/18 and 8//31 at [facility]  --PPD test repeatedly negative, last done earlier this month    Near Syncope-- reason for admission at [facility]  Poor tolerance/diarrhea from Augmentin    Active Hospital Problems    Diagnosis     Hypokalemia [E87.6]     Near syncope [R55]     Diarrhea [R19.7]     Pneumonia [J18.9]     PAF (paroxysmal atrial fibrillation) (HCC) [I48.0]     Chronic systolic congestive heart failure (HCC) [I50.22]     Hx of pleural empyema [Z87.09]             Recommendation -- ID related:     DC IV antibiotics  Will place her on Clindamycin PO-- to continue to cover anearobes.  Will see how she does while inpatient  Cab treat by PO antibiotics X4 weeks from CT removal as planned by Dr Mau Miller- ID -- to 9/28/23  Needs FU CT chest in 4-6 weeks from end of rx-- for FU    Can DC c diff isolation, ABX associated diarrhea, seems the sensitivity for evaluation of the vasculature and solid organs. FINDINGS: LOWER THORAX: Emphysema. Small right-sided pleural effusion with right basilar atelectasis. LIVER: No mass. BILIARY TREE: Gallbladder is within normal limits. CBD is not dilated. SPLEEN: within normal limits. PANCREAS: No focal abnormality. ADRENALS: Unremarkable. KIDNEYS/URETERS: No calculus or hydronephrosis. STOMACH: Unremarkable. SMALL BOWEL: No dilatation or wall thickening. COLON: No dilatation or wall thickening. APPENDIX: Unremarkable. PERITONEUM: No ascites or pneumoperitoneum. RETROPERITONEUM: No lymphadenopathy or aortic aneurysm. REPRODUCTIVE ORGANS: Coarse calcifications in the uterus are compatible with fibroids. URINARY BLADDER: Excreted contrast fills the bladder. BONES: No destructive bone lesion. L3-4 fusion. ABDOMINAL WALL: No mass or hernia. ADDITIONAL COMMENTS: N/A     1. No acute findings in the abdomen or pelvis. CTA CHEST W WO CONTRAST    Result Date: 9/18/2023  EXAM:  CTA CHEST W WO CONTRAST INDICATION: Pulmonary embolism, persistent tachycardia, history of pneumonia, empyema COMPARISON: None. TECHNIQUE: Helical thin section chest CT following uneventful intravenous administration of nonionic contrast 100 mL of isovue 370 according to departmental PE protocol. Coronal and sagittal reformats were performed. 3D post processing was performed. CT dose reduction was achieved through the use of a standardized protocol tailored for this examination and automatic exposure control for dose modulation. FINDINGS: This is a good quality study for the evaluation of pulmonary embolism to the first subsegmental arterial level. There is no pulmonary embolism to this level. THYROID: No nodule. MEDIASTINUM: No mass or lymphadenopathy. ERICA: No mass or lymphadenopathy. THORACIC AORTA: No aneurysm. HEART: Normal in size. ESOPHAGUS: No wall thickening or dilatation. TRACHEA/BRONCHI: Patent.  PLEURA: Trace right pleural effusion,

## 2023-09-19 NOTE — PROGRESS NOTES
conducted an initial consultation and spiritual assessment for Aniya Theodroe, who is a 82 y.o.,female. Initiated a relationship of care and support. Explored issues of markus, belief, spirituality and Sikh/ritual needs. Request  visit for [address] [place] [address]. Listened empathically. Provided information about Spiritual Care Services.  confirmed Patient's Jew Affiliation. Patient processed feelings about current hospitalization. Offered prayer and assurance of continued prayers on patients behalf. Chart reviewed. Patient expressed gratitude for Spiritual Care visit. Chaplains will continue to follow and will provide pastoral care as needed or requested.  recommends bedside caregivers page  on duty if patient shows signs of acute spiritual or emotional distress. [facility] [id number], [address] [facility] Derek Sanchez M.Div.   Mary Jo Mcintosh  [phone] - Office

## 2023-09-19 NOTE — ACP (ADVANCE CARE PLANNING)
Advance Care Planning     General Advance Care Planning (ACP) Conversation    Date of Conversation: 9/19/2023  Conducted with: Patient with Decision Making Capacity   Healthcare Decision Maker: Named in Advance Directive or Healthcare Power of [phone]a Alarcon Decision Maker:    Primary Decision Maker: Foreign [place] - Friend    Secondary Decision Maker: [facility] [phone]     Today we documented Decision Maker(s) consistent with ACP documents on file. Content/Action Overview: Has ACP document(s) on file - reflects the patient's care preferences  Reviewed DNR/DNI and patient elects DNR order - completed POST and placed order (see below)    [phone] Seen today in room 356 along with Tanda Alpers, NP. Lying in bed with head of bed elevated eating breakfast.  Awake, alert. Oriented x 4. Respirations unlabored on room air. Able to speak in full  sentences. Pain -- denies. (+) lightheadedness Courtney, friend, at bedside    Came to the ED 9/18/2023 from home per EMS for a near syncopal episode. She has been having issues with lightheadedness since a recent admission at [facility] on 8/25/2023 for pneumonia. Required chest tube insertion for empyema. 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, heart failure, paroxysmal atrial fibrillation (information gathered from medical records)    Admitted to telemetry for near syncope (trend orthostatic vitals, CT head, CT chest, ECHO, trend troponin, cardiology consultation), hx empyema (continue previously started antibiotics, prn nebulizer treatments), diarrhea (r/o c diff), paroxysmal atrial fibrillation (keep electrolytes WNL), heart failure history with EF 45% (watch for fluid overload    Palliative Medicine consultation placed by Dr Jamaica Matthew for code status discussion    Introduced role of palliative care to the hospitalized patient. Lives in a single family home. Prior to admission, she was independent in ADLs.

## 2023-09-20 ENCOUNTER — APPOINTMENT (OUTPATIENT)
Facility: HOSPITAL | Age: 83
DRG: 312 | End: 2023-09-20
Attending: INTERNAL MEDICINE
Payer: MEDICARE

## 2023-09-20 LAB
ANION GAP SERPL CALC-SCNC: 3 MMOL/L (ref 3–18)
BASOPHILS # BLD: 0 K/UL (ref 0–0.1)
BASOPHILS NFR BLD: 1 % (ref 0–2)
BUN SERPL-MCNC: 11 MG/DL (ref 7–18)
BUN/CREAT SERPL: 20 (ref 12–20)
CALCIUM SERPL-MCNC: 8.2 MG/DL (ref 8.5–10.1)
CHLORIDE SERPL-SCNC: 115 MMOL/L (ref 100–111)
CO2 SERPL-SCNC: 26 MMOL/L (ref 21–32)
CREAT SERPL-MCNC: 0.56 MG/DL (ref 0.6–1.3)
DIFFERENTIAL METHOD BLD: ABNORMAL
ECHO AO ROOT DIAM: 3 CM
ECHO AO ROOT INDEX: 2.01 CM/M2
ECHO AR MAX VEL PISA: 3.8 M/S
ECHO AV AREA PEAK VELOCITY: 2.3 CM2
ECHO AV AREA VTI: 2.9 CM2
ECHO AV AREA/BSA PEAK VELOCITY: 1.5 CM2/M2
ECHO AV AREA/BSA VTI: 1.9 CM2/M2
ECHO AV MEAN GRADIENT: 3 MMHG
ECHO AV MEAN VELOCITY: 0.9 M/S
ECHO AV PEAK GRADIENT: 6 MMHG
ECHO AV PEAK VELOCITY: 1.2 M/S
ECHO AV REGURGITANT PHT: 5595.4 MILLISECOND
ECHO AV VELOCITY RATIO: 0.83
ECHO AV VTI: 19.5 CM
ECHO BSA: 1.5 M2
ECHO BSA: 1.5 M2
ECHO LA DIAMETER INDEX: 1.68 CM/M2
ECHO LA DIAMETER: 2.5 CM
ECHO LA TO AORTIC ROOT RATIO: 0.83
ECHO LA VOL 4C: 18 ML (ref 22–52)
ECHO LA VOLUME INDEX A4C: 12 ML/M2 (ref 16–34)
ECHO LV E' LATERAL VELOCITY: 6 CM/S
ECHO LV E' SEPTAL VELOCITY: 5 CM/S
ECHO LV EDV A2C: 43 ML
ECHO LV EDV A4C: 56 ML
ECHO LV EDV BP: 49 ML (ref 56–104)
ECHO LV EDV INDEX A4C: 38 ML/M2
ECHO LV EDV INDEX BP: 33 ML/M2
ECHO LV EDV NDEX A2C: 29 ML/M2
ECHO LV EJECTION FRACTION A2C: 51 %
ECHO LV EJECTION FRACTION A4C: 53 %
ECHO LV EJECTION FRACTION BIPLANE: 50 % (ref 55–100)
ECHO LV ESV A2C: 21 ML
ECHO LV ESV A4C: 26 ML
ECHO LV ESV BP: 24 ML (ref 19–49)
ECHO LV ESV INDEX A2C: 14 ML/M2
ECHO LV ESV INDEX A4C: 17 ML/M2
ECHO LV ESV INDEX BP: 16 ML/M2
ECHO LV FRACTIONAL SHORTENING: 29 % (ref 28–44)
ECHO LV INTERNAL DIMENSION DIASTOLE INDEX: 2.82 CM/M2
ECHO LV INTERNAL DIMENSION DIASTOLIC: 4.2 CM (ref 3.9–5.3)
ECHO LV INTERNAL DIMENSION SYSTOLIC INDEX: 2.01 CM/M2
ECHO LV INTERNAL DIMENSION SYSTOLIC: 3 CM
ECHO LV IVSD: 0.7 CM (ref 0.6–0.9)
ECHO LV MASS 2D: 85.1 G (ref 67–162)
ECHO LV MASS INDEX 2D: 57.1 G/M2 (ref 43–95)
ECHO LV POSTERIOR WALL DIASTOLIC: 0.7 CM (ref 0.6–0.9)
ECHO LV RELATIVE WALL THICKNESS RATIO: 0.33
ECHO LVOT AREA: 2.8 CM2
ECHO LVOT AV VTI INDEX: 1.06
ECHO LVOT DIAM: 1.9 CM
ECHO LVOT MEAN GRADIENT: 2 MMHG
ECHO LVOT PEAK GRADIENT: 4 MMHG
ECHO LVOT PEAK VELOCITY: 1 M/S
ECHO LVOT STROKE VOLUME INDEX: 39.4 ML/M2
ECHO LVOT SV: 58.7 ML
ECHO LVOT VTI: 20.7 CM
ECHO PULMONARY ARTERY END DIASTOLIC PRESSURE: 6 MMHG
ECHO PV REGURGITANT MAX VELOCITY: 1.2 M/S
ECHO RA END SYSTOLIC VOLUME APICAL 4 CHAMBER INDEX BSA: 21 ML/M2
ECHO RA VOLUME: 31 ML
ECHO RV FREE WALL PEAK S': 12 CM/S
ECHO RV INTERNAL DIMENSION: 2.2 CM
ECHO RV TAPSE: 2.3 CM (ref 1.7–?)
EOSINOPHIL # BLD: 0.3 K/UL (ref 0–0.4)
EOSINOPHIL NFR BLD: 4 % (ref 0–5)
ERYTHROCYTE [DISTWIDTH] IN BLOOD BY AUTOMATED COUNT: 15.5 % (ref 11.6–14.5)
GLUCOSE BLD STRIP.AUTO-MCNC: 101 MG/DL (ref 70–110)
GLUCOSE SERPL-MCNC: 120 MG/DL (ref 74–99)
HCT VFR BLD AUTO: 35.9 % (ref 35–45)
HGB BLD-MCNC: 11.6 G/DL (ref 12–16)
IMM GRANULOCYTES # BLD AUTO: 0 K/UL (ref 0–0.04)
IMM GRANULOCYTES NFR BLD AUTO: 0 % (ref 0–0.5)
LYMPHOCYTES # BLD: 2 K/UL (ref 0.9–3.6)
LYMPHOCYTES NFR BLD: 27 % (ref 21–52)
MCH RBC QN AUTO: 31 PG (ref 24–34)
MCHC RBC AUTO-ENTMCNC: 32.3 G/DL (ref 31–37)
MCV RBC AUTO: 96 FL (ref 78–100)
MONOCYTES # BLD: 1 K/UL (ref 0.05–1.2)
MONOCYTES NFR BLD: 14 % (ref 3–10)
NEUTS SEG # BLD: 4 K/UL (ref 1.8–8)
NEUTS SEG NFR BLD: 54 % (ref 40–73)
NRBC # BLD: 0 K/UL (ref 0–0.01)
NRBC BLD-RTO: 0 PER 100 WBC
PLATELET # BLD AUTO: 351 K/UL (ref 135–420)
PMV BLD AUTO: 9.8 FL (ref 9.2–11.8)
POTASSIUM SERPL-SCNC: 4.2 MMOL/L (ref 3.5–5.5)
RBC # BLD AUTO: 3.74 M/UL (ref 4.2–5.3)
SODIUM SERPL-SCNC: 144 MMOL/L (ref 136–145)
VAS LEFT CCA DIST EDV: 19.3 CM/S
VAS LEFT CCA DIST PSV: 58.9 CM/S
VAS LEFT CCA PROX EDV: 11.6 CM/S
VAS LEFT CCA PROX PSV: 46.8 CM/S
VAS LEFT ECA EDV: 0 CM/S
VAS LEFT ECA PSV: 53.4 CM/S
VAS LEFT ICA DIST EDV: 34.7 CM/S
VAS LEFT ICA DIST PSV: 84.1 CM/S
VAS LEFT ICA MID EDV: 24.8 CM/S
VAS LEFT ICA MID PSV: 58.9 CM/S
VAS LEFT ICA PROX EDV: 16 CM/S
VAS LEFT ICA PROX PSV: 38 CM/S
VAS LEFT ICA/CCA PSV: 1.4 NO UNITS
VAS LEFT SUBCLAVIAN PROX EDV: 0 CM/S
VAS LEFT SUBCLAVIAN PROX PSV: 144 CM/S
VAS LEFT VERTEBRAL EDV: 17.1 CM/S
VAS LEFT VERTEBRAL PSV: 50.1 CM/S
VAS RIGHT CCA DIST EDV: 18.1 CM/S
VAS RIGHT CCA DIST PSV: 62.5 CM/S
VAS RIGHT CCA PROX EDV: 15.5 CM/S
VAS RIGHT CCA PROX PSV: 55.9 CM/S
VAS RIGHT ECA EDV: 8.7 CM/S
VAS RIGHT ECA PSV: 65.8 CM/S
VAS RIGHT ICA DIST EDV: 26.3 CM/S
VAS RIGHT ICA DIST PSV: 69.1 CM/S
VAS RIGHT ICA MID EDV: 27.4 CM/S
VAS RIGHT ICA MID PSV: 68 CM/S
VAS RIGHT ICA PROX EDV: 14.2 CM/S
VAS RIGHT ICA PROX PSV: 66.9 CM/S
VAS RIGHT ICA/CCA PSV: 1.1 NO UNITS
VAS RIGHT SUBCLAVIAN PROX EDV: 13.9 CM/S
VAS RIGHT SUBCLAVIAN PROX PSV: 110.7 CM/S
VAS RIGHT VERTEBRAL EDV: 18.6 CM/S
VAS RIGHT VERTEBRAL PSV: 47.1 CM/S
WBC # BLD AUTO: 7.3 K/UL (ref 4.6–13.2)

## 2023-09-20 PROCEDURE — 1100000003 HC PRIVATE W/ TELEMETRY

## 2023-09-20 PROCEDURE — 36415 COLL VENOUS BLD VENIPUNCTURE: CPT

## 2023-09-20 PROCEDURE — 2580000003 HC RX 258: Performed by: HOSPITALIST

## 2023-09-20 PROCEDURE — 82962 GLUCOSE BLOOD TEST: CPT

## 2023-09-20 PROCEDURE — 97116 GAIT TRAINING THERAPY: CPT

## 2023-09-20 PROCEDURE — 93306 TTE W/DOPPLER COMPLETE: CPT

## 2023-09-20 PROCEDURE — 2580000003 HC RX 258: Performed by: FAMILY MEDICINE

## 2023-09-20 PROCEDURE — 97535 SELF CARE MNGMENT TRAINING: CPT

## 2023-09-20 PROCEDURE — 6360000002 HC RX W HCPCS: Performed by: HOSPITALIST

## 2023-09-20 PROCEDURE — 85025 COMPLETE CBC W/AUTO DIFF WBC: CPT

## 2023-09-20 PROCEDURE — 6370000000 HC RX 637 (ALT 250 FOR IP): Performed by: INTERNAL MEDICINE

## 2023-09-20 PROCEDURE — 80048 BASIC METABOLIC PNL TOTAL CA: CPT

## 2023-09-20 RX ORDER — SODIUM CHLORIDE, SODIUM LACTATE, POTASSIUM CHLORIDE, AND CALCIUM CHLORIDE .6; .31; .03; .02 G/100ML; G/100ML; G/100ML; G/100ML
500 INJECTION, SOLUTION INTRAVENOUS ONCE
Status: COMPLETED | OUTPATIENT
Start: 2023-09-20 | End: 2023-09-20

## 2023-09-20 RX ADMIN — ENOXAPARIN SODIUM 40 MG: 100 INJECTION SUBCUTANEOUS at 09:55

## 2023-09-20 RX ADMIN — CLINDAMYCIN HYDROCHLORIDE 300 MG: 150 CAPSULE ORAL at 16:32

## 2023-09-20 RX ADMIN — CLINDAMYCIN HYDROCHLORIDE 300 MG: 150 CAPSULE ORAL at 04:06

## 2023-09-20 RX ADMIN — CLINDAMYCIN HYDROCHLORIDE 300 MG: 150 CAPSULE ORAL at 09:55

## 2023-09-20 RX ADMIN — DEXTROSE AND SODIUM CHLORIDE: 5; 450 INJECTION, SOLUTION INTRAVENOUS at 04:11

## 2023-09-20 RX ADMIN — CLINDAMYCIN HYDROCHLORIDE 300 MG: 150 CAPSULE ORAL at 21:40

## 2023-09-20 RX ADMIN — SODIUM CHLORIDE, POTASSIUM CHLORIDE, SODIUM LACTATE AND CALCIUM CHLORIDE 500 ML: 600; 310; 30; 20 INJECTION, SOLUTION INTRAVENOUS at 06:04

## 2023-09-20 NOTE — PROGRESS NOTES
Physician Progress Note      PATIENT:               Cristy Quiñonez  CSN #:                  [id number]  :                       1940  ADMIT DATE:       2023 10:16 AM  DISCH DATE:  RESPONDING  PROVIDER #:        Christy Farias MD          QUERY TEXT:    Patient admitted with Syncope. noted to have orthostatic hypotension . If   possible, please document in progress notes and discharge summary after study   the etiology of the syncope: The medical record reflects the following:  Risk Factors: hypotension,Diarrhea    Clinical Indicators: /H&P/+ for orthostatic hypotension -received 1 L   fluid-will continue iv hydration like gi loss      Treatment: -received 1 L fluid    Thank You  Dominique Hardy RN, CDI,CRCR  Options provided:  -- Syncope due to ,please clarify, please  clarify cause  -- Syncope due to orthostatic hypotension  -- Other - I will add my own diagnosis  -- Disagree - Not applicable / Not valid  -- Disagree - Clinically unable to determine / Unknown  -- Refer to Clinical Documentation Reviewer    PROVIDER RESPONSE TEXT:    The syncope is due to orthostatic hypotension.     Query created by: Dominique Hardy on 2023 11:51 AM      Electronically signed by:  Christy Farias MD 2023 1:13 PM

## 2023-09-20 NOTE — PROGRESS NOTES
Brain MRI is negative for an acute event. I do not think her dizziness is primarily neurologic in nature. I will follow the patient as outpatient for possible other causes (neuropathy, etc)    Neurology signs off.

## 2023-09-20 NOTE — PROGRESS NOTES
Physical Therapy      1011: Nurse in with pt, will follow up.    1052: Pt refusing PT at this time, requesting to come back later today.   Will follow up

## 2023-09-20 NOTE — PROGRESS NOTES
Cardiology Progress Note        Patient: Wolf Form        Sex: female          DOA: 9/18/2023  YOB: 1940      Age:  82 y.o.        LOS:  LOS: 2 days   Assessment/Plan     Principal Problem:    Near syncope  Active Problems:    Diarrhea    Pneumonia    PAF (paroxysmal atrial fibrillation) (HCC)    Chronic systolic congestive heart failure (HCC)    Hx of pleural empyema    Hypokalemia  Resolved Problems:    * No resolved hospital problems. *      Plan:  9/20/2023  Cardiac telemetry sinus rhythm heart rate ranges between 65 beats per minute to 115 beats per minute  Today at the time patient exam patient still complaining of near-syncope with hot flushing/mild sweating symptoms when lying supine with normal rhythm, mild sinus tachycardia 110 beats per minute   I do not think her symptoms are coming from this mild sinus tachycardia   Prolonged and persistent near-syncope symptoms even lying supine with stable rhythm   I suspect possible autonomic neuropathy   Echocardiogram results discussed with patient EF 45% with trivial/small pericardial effusion without any tamponade  At this point there is no indication to put her on any midodrine with beta-blocker. Near-syncope unclear etiology,  patient is lying spine complaining of near-syncope with normal sinus rhythm, normal blood pressure and 100% oxygen. Echocardiogram is pending  Continue supportive treatment  MRI of brain is pending  Cardiac telemetry without any arrhythmia    Result status: Final result       Left Ventricle: Mildly reduced left ventricular systolic function with a visually estimated EF of 45 - 50%. Left ventricle size is normal. Normal wall thickness. Normal wall motion. Indeterminate diastolic function due to E-A fusion. Left Atrium: Left atrium is smaller than normal.    Aortic Valve: Trileaflet valve. Mild regurgitation. Mitral Valve: Mildly thickened leaflet. Mild regurgitation.     Pericardium: Trivial to small mg  300 mg Oral Q8H PRN    potassium chloride (KLOR-CON M) extended release tablet 20 mEq  20 mEq Oral Once    hydrALAZINE (APRESOLINE) injection 10 mg  10 mg IntraVENous Q6H PRN               Lab/Data Reviewed:  Procedures/imaging: see electronic medical records for all procedures/Xrays   and details which were not copied into this note but were reviewed prior to creation of Plan        Recent Labs     09/18/23  1024 09/19/23  0048 09/20/23  0532   WBC 7.9 9.4 7.3   HGB 13.6 11.2* 11.6*   HCT 42.0 34.5* 35.9    373 351       Recent Labs     09/18/23  1024 09/19/23  0502 09/20/23  0532    143 144   K 3.7 3.4* 4.2    112* 115*   CO2 26 25 26   BUN 8 11 11         RADIOLOGY:  CT HEAD WO CONTRAST    Result Date: 9/18/2023  No acute intracranial hemorrhage, mass or infarct. CT ABDOMEN PELVIS WO CONTRAST Additional Contrast? None    Result Date: 9/18/2023  1. No acute findings in the abdomen or pelvis. CTA CHEST W WO CONTRAST    Result Date: 9/18/2023  1. Negative for PE. 2.  Small right upper lobe consolidation may represent pneumonia. 3.  Trace right pleural effusion. XR CHEST PORTABLE    Result Date: 9/18/2023  Small right pleural effusion. Otherwise unremarkable study. Cardiology Procedures:   No results found for this or any previous visit. No results found for this or any previous visit. No results found for this or any previous visit.                 Signed By: Milka Bullard MD     September 20, 2023

## 2023-09-20 NOTE — PLAN OF CARE
Problem: Discharge Planning  Goal: Discharge to home or other facility with appropriate resources  Outcome: Progressing  Flowsheets (Taken 9/20/2023 0000)  Discharge to home or other facility with appropriate resources:   Identify barriers to discharge with patient and caregiver   Arrange for needed discharge resources and transportation as appropriate   Identify discharge learning needs (meds, wound care, etc)   Refer to discharge planning if patient needs post-hospital services based on physician order or complex needs related to functional status, cognitive ability or social support system     Problem: Safety - Adult  Goal: Free from fall injury  Outcome: Progressing  Flowsheets (Taken 9/20/2023 0000)  Free From Fall Injury: Instruct family/caregiver on patient safety     Problem: ABCDS Injury Assessment  Goal: Absence of physical injury  Outcome: Progressing  Flowsheets (Taken 9/20/2023 0000)  Absence of Physical Injury: Implement safety measures based on patient assessment     Problem: Chronic Conditions and Co-morbidities  Goal: Patient's chronic conditions and co-morbidity symptoms are monitored and maintained or improved  Outcome: Progressing  Flowsheets (Taken 9/20/2023 0000)  Care Plan - Patient's Chronic Conditions and Co-Morbidity Symptoms are Monitored and Maintained or Improved:   Monitor and assess patient's chronic conditions and comorbid symptoms for stability, deterioration, or improvement   Collaborate with multidisciplinary team to address chronic and comorbid conditions and prevent exacerbation or deterioration   Update acute care plan with appropriate goals if chronic or comorbid symptoms are exacerbated and prevent overall improvement and discharge      0540 - 150 ml output urine noted overnight. Bladder scan: 64 ml. Dr. Phan Clamp paged  0911 - Provider notified that patient consumed 200ml overnight and is getting IVF.  Orders placed by MD

## 2023-09-21 VITALS
DIASTOLIC BLOOD PRESSURE: 84 MMHG | TEMPERATURE: 97.9 F | OXYGEN SATURATION: 96 % | BODY MASS INDEX: 22.81 KG/M2 | WEIGHT: 120.8 LBS | RESPIRATION RATE: 18 BRPM | HEART RATE: 11 BPM | SYSTOLIC BLOOD PRESSURE: 129 MMHG | HEIGHT: 61 IN

## 2023-09-21 LAB
ANION GAP SERPL CALC-SCNC: 4 MMOL/L (ref 3–18)
BASOPHILS # BLD: 0 K/UL (ref 0–0.1)
BASOPHILS NFR BLD: 1 % (ref 0–2)
BUN SERPL-MCNC: 11 MG/DL (ref 7–18)
BUN/CREAT SERPL: 20 (ref 12–20)
CALCIUM SERPL-MCNC: 8.3 MG/DL (ref 8.5–10.1)
CHLORIDE SERPL-SCNC: 113 MMOL/L (ref 100–111)
CO2 SERPL-SCNC: 25 MMOL/L (ref 21–32)
CREAT SERPL-MCNC: 0.55 MG/DL (ref 0.6–1.3)
DIFFERENTIAL METHOD BLD: ABNORMAL
EOSINOPHIL # BLD: 0.3 K/UL (ref 0–0.4)
EOSINOPHIL NFR BLD: 4 % (ref 0–5)
ERYTHROCYTE [DISTWIDTH] IN BLOOD BY AUTOMATED COUNT: 15.3 % (ref 11.6–14.5)
GLUCOSE SERPL-MCNC: 112 MG/DL (ref 74–99)
HCT VFR BLD AUTO: 35.3 % (ref 35–45)
HGB BLD-MCNC: 11.4 G/DL (ref 12–16)
IMM GRANULOCYTES # BLD AUTO: 0 K/UL (ref 0–0.04)
IMM GRANULOCYTES NFR BLD AUTO: 0 % (ref 0–0.5)
LYMPHOCYTES # BLD: 1.9 K/UL (ref 0.9–3.6)
LYMPHOCYTES NFR BLD: 25 % (ref 21–52)
MCH RBC QN AUTO: 30.8 PG (ref 24–34)
MCHC RBC AUTO-ENTMCNC: 32.3 G/DL (ref 31–37)
MCV RBC AUTO: 95.4 FL (ref 78–100)
MONOCYTES # BLD: 0.9 K/UL (ref 0.05–1.2)
MONOCYTES NFR BLD: 11 % (ref 3–10)
NEUTS SEG # BLD: 4.6 K/UL (ref 1.8–8)
NEUTS SEG NFR BLD: 59 % (ref 40–73)
NRBC # BLD: 0 K/UL (ref 0–0.01)
NRBC BLD-RTO: 0 PER 100 WBC
PLATELET # BLD AUTO: 341 K/UL (ref 135–420)
PMV BLD AUTO: 9.9 FL (ref 9.2–11.8)
POTASSIUM SERPL-SCNC: 3.9 MMOL/L (ref 3.5–5.5)
RBC # BLD AUTO: 3.7 M/UL (ref 4.2–5.3)
SODIUM SERPL-SCNC: 142 MMOL/L (ref 136–145)
WBC # BLD AUTO: 7.7 K/UL (ref 4.6–13.2)

## 2023-09-21 PROCEDURE — 99232 SBSQ HOSP IP/OBS MODERATE 35: CPT | Performed by: NURSE PRACTITIONER

## 2023-09-21 PROCEDURE — 6370000000 HC RX 637 (ALT 250 FOR IP): Performed by: INTERNAL MEDICINE

## 2023-09-21 PROCEDURE — 2580000003 HC RX 258: Performed by: HOSPITALIST

## 2023-09-21 PROCEDURE — 36415 COLL VENOUS BLD VENIPUNCTURE: CPT

## 2023-09-21 PROCEDURE — 6370000000 HC RX 637 (ALT 250 FOR IP): Performed by: HOSPITALIST

## 2023-09-21 PROCEDURE — 80048 BASIC METABOLIC PNL TOTAL CA: CPT

## 2023-09-21 PROCEDURE — 85025 COMPLETE CBC W/AUTO DIFF WBC: CPT

## 2023-09-21 PROCEDURE — 6360000002 HC RX W HCPCS: Performed by: HOSPITALIST

## 2023-09-21 RX ORDER — LOPERAMIDE HYDROCHLORIDE 2 MG/1
2 CAPSULE ORAL 4 TIMES DAILY PRN
Qty: 3 CAPSULE | Refills: 0 | Status: SHIPPED | OUTPATIENT
Start: 2023-09-21 | End: 2023-10-01

## 2023-09-21 RX ORDER — GABAPENTIN 300 MG/1
300 CAPSULE ORAL 3 TIMES DAILY
Qty: 3 CAPSULE | Refills: 0 | Status: SHIPPED | OUTPATIENT
Start: 2023-09-21 | End: 2023-09-22

## 2023-09-21 RX ORDER — SACCHAROMYCES BOULARDII 250 MG
500 CAPSULE ORAL 2 TIMES DAILY
Status: DISCONTINUED | OUTPATIENT
Start: 2023-09-21 | End: 2023-09-21 | Stop reason: HOSPADM

## 2023-09-21 RX ORDER — SACCHAROMYCES BOULARDII 250 MG
500 CAPSULE ORAL 2 TIMES DAILY
Qty: 28 CAPSULE | Refills: 0 | Status: SHIPPED | OUTPATIENT
Start: 2023-09-21 | End: 2023-09-28

## 2023-09-21 RX ORDER — LOPERAMIDE HYDROCHLORIDE 2 MG/1
2 CAPSULE ORAL 4 TIMES DAILY PRN
Status: DISCONTINUED | OUTPATIENT
Start: 2023-09-21 | End: 2023-09-21 | Stop reason: HOSPADM

## 2023-09-21 RX ORDER — CLINDAMYCIN HYDROCHLORIDE 300 MG/1
300 CAPSULE ORAL 3 TIMES DAILY
Qty: 27 CAPSULE | Refills: 0 | Status: SHIPPED | OUTPATIENT
Start: 2023-09-21 | End: 2023-09-30

## 2023-09-21 RX ADMIN — ENOXAPARIN SODIUM 40 MG: 100 INJECTION SUBCUTANEOUS at 10:32

## 2023-09-21 RX ADMIN — CLINDAMYCIN HYDROCHLORIDE 300 MG: 150 CAPSULE ORAL at 13:41

## 2023-09-21 RX ADMIN — Medication 500 MG: at 13:40

## 2023-09-21 RX ADMIN — SODIUM CHLORIDE, PRESERVATIVE FREE 10 ML: 5 INJECTION INTRAVENOUS at 10:35

## 2023-09-21 NOTE — PROGRESS NOTES
Patient medically stable for DC. Patient to DC to:    [facility] in [place], [place]  Address: [address], [facility], [address] [place] [address]    Phone: [phone]      Transport requested for 1430pm. Patient in agreement with DC plan.

## 2023-09-21 NOTE — PROGRESS NOTES
Patient agreeable to SNF. Patient wanting a referral to be placed to [facility]. Awaiting bed availability. SW to follow.

## 2023-09-21 NOTE — PROGRESS NOTES
0128 Attempted to hang new bag of D5 1/2 NS per order, patient refused stating that she wanted the fluids to be turned off so she could rest for the night. Stopped infusion at this time.

## 2023-09-21 NOTE — DISCHARGE SUMMARY
Discharge Summary    Patient: Zina Thomason MRN: [id number]  CSN: [id number]    YOB: 1940  Age: 82 y.o. Sex: female    DOA: 9/18/2023 LOS:  LOS: 3 days   Discharge Date:      Primary Care Provider:  [facility], DO    Admission Diagnoses: Near syncope [R55]    Discharge Diagnoses: Active Hospital Problems    Diagnosis Date Noted    Hypokalemia [E87.6] 09/19/2023    Near syncope [R55] 09/18/2023    Diarrhea [R19.7] 09/18/2023    Pneumonia [J18.9] 09/18/2023    PAF (paroxysmal atrial fibrillation) ([facility]) [I48.0] 09/18/2023    Chronic systolic congestive heart failure (HCC) [I50.22] 09/18/2023    Hx of pleural empyema [Z87.09] 09/18/2023       Discharge Condition: stable     Discharge Medications:        Medication List        START taking these medications      clindamycin 300 MG capsule  Commonly known as: CLEOCIN  Take 1 capsule by mouth 3 times daily for 9 days     loperamide 2 MG capsule  Commonly known as: IMODIUM  Take 1 capsule by mouth 4 times daily as needed for Diarrhea     saccharomyces boulardii 250 MG capsule  Commonly known as: FLORASTOR  Take 2 capsules by mouth 2 times daily for 7 days            CHANGE how you take these medications      gabapentin 300 MG capsule  Commonly known as: NEURONTIN  Take 1 capsule by mouth 3 times daily for 1 day. Max Daily Amount: 900 mg  What changed:   how much to take  how to take this  Another medication with the same name was removed. Continue taking this medication, and follow the directions you see here.             CONTINUE taking these medications      vitamin D3 125 MCG (5000 UT) Caps            STOP taking these medications      ACETAMINOPHEN-CODEINE #3 300-30 MG PO TABS (STARTER PACK)     amoxicillin-clavulanate 875-125 MG per tablet  Commonly known as: AUGMENTIN               Where to Get Your Medications        These medications were sent to [facility],[address], [address] [employer]

## 2023-09-21 NOTE — PROGRESS NOTES
[address] [address] [phone]  [facility] [phone]     Palliative team, Rosie Garcia NP and this LMSW met with pt at bedside for follow up visit. Medical team has not been able to make definitive determination as to what is causing dizziness and near syncopal episodes. Upon entry, pt laying in bed with head elevated. [id number]. Pt verbalized disappointment that they were not able to find cause of her repeated episodes of dizziness/near syncope. She reports she's glad, but wants to know what's causing it. She reports they have told her she needs Rehab prior to going home, and asked about reputable facilities in the area. She reported some of the facilities the CM has referred her to. She was informed we cannot make recommendations, but have not heard bad things about these facilities. Pt informed her CM appears to be acting as an advocate for her in this process. Pt expressed concerns about returning home without someone to keep an eye on her. LMSW asked if she has a med-alert system. Pt reports, yes; but the one with the button you push, because they one that activates when you fall over is too sensitive and goes off even when she bends over to pick something up. She reports she had to change back to the button because of this. Pt reports, \"If I fall and don't wake me up, just let me go. \"  Pt given encouragement and supportive counseling regarding going to rehab prior to returning home. Pt reports she did not take her Abx today, because she feels it is making her have frequent soft stool, bowel movements. She reports she's gone three times today already. She reports stool is not watery or runny, just soft. Pt's RN informed pt, he is going to speak with MD about her concerns. Discussion about adding probiotics to pt's regimen discussed. Pt reports she looks forward to hearing what the MD has to say.   Pt reports no further questions or concerns at this